# Patient Record
Sex: FEMALE | Race: WHITE | URBAN - METROPOLITAN AREA
[De-identification: names, ages, dates, MRNs, and addresses within clinical notes are randomized per-mention and may not be internally consistent; named-entity substitution may affect disease eponyms.]

---

## 2019-04-24 ENCOUNTER — OFFICE VISIT (OUTPATIENT)
Dept: PRIMARY CARE CLINIC | Age: 44
End: 2019-04-24

## 2019-04-24 VITALS
HEIGHT: 63 IN | OXYGEN SATURATION: 99 % | HEART RATE: 71 BPM | BODY MASS INDEX: 30.37 KG/M2 | SYSTOLIC BLOOD PRESSURE: 118 MMHG | DIASTOLIC BLOOD PRESSURE: 82 MMHG | WEIGHT: 171.4 LBS | RESPIRATION RATE: 16 BRPM | TEMPERATURE: 98.1 F

## 2019-04-24 DIAGNOSIS — E66.9 OBESITY (BMI 30-39.9): ICD-10-CM

## 2019-04-24 DIAGNOSIS — R53.83 FATIGUE, UNSPECIFIED TYPE: ICD-10-CM

## 2019-04-24 DIAGNOSIS — Z82.49 FAMILY HISTORY OF HEART DISEASE: ICD-10-CM

## 2019-04-24 DIAGNOSIS — R00.2 INTERMITTENT PALPITATIONS: Primary | ICD-10-CM

## 2019-04-24 NOTE — PROGRESS NOTES
HPI:  
 
Chief Complaint Patient presents with [facility] Establish Care  Palpitations  
  patient reports rapid heart rate since last thursday, would like to discuss with you more in detail. Patient is a 37 y.o. female with no significant history who presents as new patient for evaluation of palpitations and fatigue. Patient reports intermittent \"heart racing\" over 3 day period this past weekend, but asymptomatic the past 2 days. She feels fine today. Palpitations occur randomly, both with activity and at rest.  Denies associated chest pain/pressure, dyspnea, dizziness, lightheadedness, nausea, vomiting, diaphoresis, abdominal pain, back pain. Has felt more fatigued past several months. Has little energy. Denies increased stress recently. Does drink about 4 cups of coffee in the morning and a bottle of wine at night. She tries to drink 64 oz water daily. Reports her father had 3 heart attacks,  first MI at age 55. Reports her daughter has pacemaker, but does not recall the name of her condition. States that her father also had pacemaker for the same condition. Patient Active Problem List  
Diagnosis Code  Obesity (BMI 30-39. 9) E66.9 Current Outpatient Medications Medication Sig Dispense Refill  mv-min/iron/folic/calcium/vitK (WOMEN'S MULTIVITAMIN PO) Take  by mouth. No Known Allergies History reviewed. No pertinent past medical history. Past Surgical History:  
Procedure Laterality Date  HX ABDOMINOPLASTY  2017  HX BREAST AUGMENTATION  2001  HX OTHER SURGICAL  2010  
 tubal reversal   
 HX TUBAL LIGATION  1998  HX WISDOM TEETH EXTRACTION Family History Problem Relation Age of Onset  Heart Attack Father   
     age 55; x3  
 Heart Disease Father   
     pacemaker, defibrillator  Pancreatic Cancer Paternal Grandmother  Heart Disease Paternal Grandfather  Cancer Paternal Grandfather   
     throat cancer  Heart Disease Daughter pacemaker Social History Tobacco Use  Smoking status: Former Smoker Years: 25.00 Last attempt to quit: 2017 Years since quittin.3  Smokeless tobacco: Never Used Substance Use Topics  Alcohol use: Yes Comment: 4 glasses of wine per night ROS:  
Pertinent items are noted in HPI. Objective:  
 
Vitals:  
 19 0825 BP: 118/82 Pulse: 71 Resp: 16 Temp: 98.1 °F (36.7 °C) TempSrc: Oral  
SpO2: 99% Weight: 171 lb 6.4 oz (77.7 kg) Height: 5' 3\" (1.6 m) Vitals and Nurse Documentation reviewed. Physical Examination:  
General appearance - alert, well appearing, and in no distress Mental status - alert, oriented to person, place, and time, normal mood, behavior, speech, dress, motor activity, and thought processes Eyes - pupils equal and reactive, extraocular eye movements intact Ears - bilateral TM's and external ear canals normal 
Nose - normal and patent, no erythema, discharge or polyps Mouth - mucous membranes moist, pharynx normal without lesions Neck - supple, no significant adenopathy, thyroid is normal in size without nodules or tenderness Chest - clear to auscultation, no wheezes, rales or rhonchi, symmetric air entry Heart - normal rate, regular rhythm, normal S1, S2, no murmurs, rubs, clicks or gallops Neurological - alert, oriented, normal speech, no focal findings or movement disorder noted Extremities - peripheral pulses normal, no pedal edema, no clubbing or cyanosis Assessment/ Plan:  
Diagnoses and all orders for this visit: 
 
1. Intermittent palpitations -     Intermittent palpitations over 3 day period, asymptomatic for past 2 days.   
-     AMB POC EKG ROUTINE W/ 12 LEADS, INTER & REP with sinus rhythm, no acute ST-T wave changes. Reviewed EKG with Dr. Efrain Díaz. -     Advised that she cut down/eliminate caffeine and gradually reduce alcohol intake -     Considering family history, will send to cardiology for evaluation -     Discussed s/s that would warrant prompt follow-up or urgent evaluation in the ED  
-     Anurag Quezada 2. Fatigue, unspecified type 
-     CBC WITH AUTOMATED DIFF 
-     METABOLIC PANEL, COMPREHENSIVE 
-     TSH AND FREE T4 
-     VITAMIN D, 25 HYDROXY 
-     LYME AB TOTAL W/RFLX W BLOT - patient lives in [facility], has had frequent tick bites in the past, most recently 1 week ago. -     REFERRAL TO CARDIOLOGY 3. Family history of heart disease 
-     REFERRAL TO CARDIOLOGY 4. Obesity (BMI 30-39.9) -     BMI discussed with patient. Discussed lifestyle changes, daily physical activity, and advised 150 minutes of exercise weekly. Discussed healthy diet choices and limiting fried, fatty foods, fast foods, processed foods, sugar-sweetened beverages/soda, and added sugars. Increase fruits, vegetables, low-fat dairy products, lean proteins, and whole grains. Follow-up and Dispositions · Return in about 6 weeks (around 6/5/2019) for Return for CPE (Physical Exam), fasting labs. I have discussed the diagnosis with the patient and the intended plan as seen in the above orders. Advised prompt follow-up if symptoms worsen or fail to improve and symptoms that would warrant emergent evaluation in ED. The patient has received an after-visit summary and questions were answered concerning future plans. I have discussed medication side effects and warnings with the patient as well. Patient expressed understanding and is in agreement with the diagnosis and plan.

## 2019-04-24 NOTE — PATIENT INSTRUCTIONS
Palpitations: Care Instructions Your Care Instructions Heart palpitations are the uncomfortable sensation that your heart is beating fast or irregularly. You might feel pounding or fluttering in your chest. It might feel like your heart is skipping a beat. Although palpitations may be caused by a heart problem, they also occur because of stress, fatigue, or use of alcohol, caffeine, or nicotine. Many medicines, including diet pills, antihistamines, decongestants, and some herbal products, can cause heart palpitations. Nearly everyone has palpitations from time to time. Depending on your symptoms, your doctor may need to do more tests to try to find the cause of your palpitations. Follow-up care is a key part of your treatment and safety. Be sure to make and go to all appointments, and call your doctor if you are having problems. It's also a good idea to know your test results and keep a list of the medicines you take. How can you care for yourself at home? · Avoid caffeine, nicotine, and excess alcohol. · Do not take illegal drugs, such as methamphetamines and cocaine. · Do not take weight loss or diet medicines unless you talk with your doctor first. 
· Get plenty of sleep. · Do not overeat. · If you have palpitations again, take deep breaths and try to relax. This may slow a racing heart. · If you start to feel lightheaded, lie down to avoid injuries that might result if you pass out and fall down. · Keep a record of your palpitations and bring it to your next doctor's appointment. Write down: ? The date and time. ? Your pulse. (If your heart is beating fast, it may be hard to count your pulse.) ? What you were doing when the palpitations started. ? How long the palpitations lasted. ? Any other symptoms. · If an activity causes palpitations, slow down or stop. Talk to your doctor before you do that activity again. · Take your medicines exactly as prescribed.  Call your doctor if you think you are having a problem with your medicine. When should you call for help? Call 911 anytime you think you may need emergency care. For example, call if: 
  · You passed out (lost consciousness).  
  · You have symptoms of a heart attack. These may include: 
? Chest pain or pressure, or a strange feeling in the chest. 
? Sweating. ? Shortness of breath. ? Pain, pressure, or a strange feeling in the back, neck, jaw, or upper belly or in one or both shoulders or arms. ? Lightheadedness or sudden weakness. ? A fast or irregular heartbeat. After you call 911, the  may tell you to chew 1 adult-strength or 2 to 4 low-dose aspirin. Wait for an ambulance. Do not try to drive yourself.  
  · You have symptoms of a stroke. These may include: 
? Sudden numbness, tingling, weakness, or loss of movement in your face, arm, or leg, especially on only one side of your body. ? Sudden vision changes. ? Sudden trouble speaking. ? Sudden confusion or trouble understanding simple statements. ? Sudden problems with walking or balance. ? A sudden, severe headache that is different from past headaches.  
 Call your doctor now or seek immediate medical care if: 
  · You have heart palpitations and: ? Are dizzy or lightheaded, or you feel like you may faint. ? Have new or increased shortness of breath.  
 Watch closely for changes in your health, and be sure to contact your doctor if: 
  · You continue to have heart palpitations. Where can you learn more? Go to http://kevin-sara.info/. Enter R508 in the search box to learn more about \"Palpitations: Care Instructions. \" Current as of: July 22, 2018 Content Version: 11.9 © 8932-1197 [employer]. Care instructions adapted under license by [employer] (which disclaims liability or warranty for this information).  If you have questions about a medical condition or this instruction, always ask your healthcare professional. [place] [address] any warranty or liability for your use of this information.

## 2019-04-24 NOTE — PROGRESS NOTES
Chief Complaint Patient presents with [facility] Establish Care  Complete Physical  
  had coffee with milk and sugar in it  Palpitations  
  patient reports rapid heart rate since last thursday, would like to discuss with you more in detail. 1. Have you been to the ER, urgent care clinic since your last visit? Hospitalized since your last visit? No 
 
2. Have you seen or consulted any other health care providers outside of the [address] [place] [address] since your last visit? Include any pap smears or colon screening.  No

## 2019-04-25 LAB
25(OH)D3+25(OH)D2 SERPL-MCNC: 37 NG/ML (ref 30–100)
ALBUMIN SERPL-MCNC: 4.8 G/DL (ref 3.5–5.5)
ALBUMIN/GLOB SERPL: 2.2 {RATIO} (ref 1.2–2.2)
ALP SERPL-CCNC: 58 IU/L (ref 39–117)
ALT SERPL-CCNC: 29 IU/L (ref 0–32)
AST SERPL-CCNC: 21 IU/L (ref 0–40)
B BURGDOR IGG+IGM SER-ACNC: <0.91 ISR (ref 0–0.9)
BASOPHILS # BLD AUTO: 0.1 X10E3/UL (ref 0–0.2)
BASOPHILS NFR BLD AUTO: 1 %
BILIRUB SERPL-MCNC: 0.4 MG/DL (ref 0–1.2)
BUN SERPL-MCNC: 11 MG/DL (ref 6–24)
BUN/CREAT SERPL: 15 (ref 9–23)
CALCIUM SERPL-MCNC: 9.5 MG/DL (ref 8.7–10.2)
CHLORIDE SERPL-SCNC: 101 MMOL/L (ref 96–106)
CO2 SERPL-SCNC: 21 MMOL/L (ref 20–29)
CREAT SERPL-MCNC: 0.75 MG/DL (ref 0.57–1)
EOSINOPHIL # BLD AUTO: 0.1 X10E3/UL (ref 0–0.4)
EOSINOPHIL NFR BLD AUTO: 1 %
ERYTHROCYTE [DISTWIDTH] IN BLOOD BY AUTOMATED COUNT: 13.2 % (ref 12.3–15.4)
GLOBULIN SER CALC-MCNC: 2.2 G/DL (ref 1.5–4.5)
GLUCOSE SERPL-MCNC: 92 MG/DL (ref 65–99)
HCT VFR BLD AUTO: 44.9 % (ref 34–46.6)
HGB BLD-MCNC: 14.7 G/DL (ref 11.1–15.9)
IMM GRANULOCYTES # BLD AUTO: 0 X10E3/UL (ref 0–0.1)
IMM GRANULOCYTES NFR BLD AUTO: 0 %
LYMPHOCYTES # BLD AUTO: 2.1 X10E3/UL (ref 0.7–3.1)
LYMPHOCYTES NFR BLD AUTO: 32 %
MCH RBC QN AUTO: 32.9 PG (ref 26.6–33)
MCHC RBC AUTO-ENTMCNC: 32.7 G/DL (ref 31.5–35.7)
MCV RBC AUTO: 100 FL (ref 79–97)
MONOCYTES # BLD AUTO: 0.5 X10E3/UL (ref 0.1–0.9)
MONOCYTES NFR BLD AUTO: 7 %
NEUTROPHILS # BLD AUTO: 3.9 X10E3/UL (ref 1.4–7)
NEUTROPHILS NFR BLD AUTO: 59 %
PLATELET # BLD AUTO: 278 X10E3/UL (ref 150–379)
POTASSIUM SERPL-SCNC: 4.2 MMOL/L (ref 3.5–5.2)
PROT SERPL-MCNC: 7 G/DL (ref 6–8.5)
RBC # BLD AUTO: 4.47 X10E6/UL (ref 3.77–5.28)
SODIUM SERPL-SCNC: 139 MMOL/L (ref 134–144)
T4 FREE SERPL-MCNC: 1.21 NG/DL (ref 0.82–1.77)
TSH SERPL DL<=0.005 MIU/L-ACNC: 3.36 UIU/ML (ref 0.45–4.5)
WBC # BLD AUTO: 6.6 X10E3/UL (ref 3.4–10.8)

## 2019-04-25 NOTE — PROGRESS NOTES
Please notify patient: 
 
CBC, kidney, liver, thyroid, and vitamin D are normal.  Lyme disease antibodies are negative.

## 2019-05-02 ENCOUNTER — CLINICAL SUPPORT (OUTPATIENT)
Dept: CARDIOLOGY CLINIC | Age: 44
End: 2019-05-02

## 2019-05-02 ENCOUNTER — OFFICE VISIT (OUTPATIENT)
Dept: CARDIOLOGY CLINIC | Age: 44
End: 2019-05-02

## 2019-05-02 VITALS
WEIGHT: 170.8 LBS | DIASTOLIC BLOOD PRESSURE: 70 MMHG | OXYGEN SATURATION: 99 % | RESPIRATION RATE: 16 BRPM | HEART RATE: 68 BPM | BODY MASS INDEX: 30.26 KG/M2 | SYSTOLIC BLOOD PRESSURE: 110 MMHG | HEIGHT: 63 IN

## 2019-05-02 DIAGNOSIS — R00.2 HEART PALPITATIONS: ICD-10-CM

## 2019-05-02 DIAGNOSIS — Z82.49 FAMILY HISTORY OF EARLY CAD: ICD-10-CM

## 2019-05-02 DIAGNOSIS — Z87.891 HISTORY OF TOBACCO USE: ICD-10-CM

## 2019-05-02 DIAGNOSIS — R00.2 HEART PALPITATIONS: Primary | ICD-10-CM

## 2019-05-02 NOTE — PROGRESS NOTES
Chief Complaint   Patient presents with    Palpitations     Family history of CAD. Complaints of shortness of breath. Denies chest pain/swelling/dizziness.       Fatigue

## 2019-05-02 NOTE — PROGRESS NOTES
[facility] [address]: [place]  [phone]    History of Present Illness:  Ms. Grace Whelan is a 38 yo F with history of tobacco use, quit in 2017, family history of early coronary artery disease, referred Samanta Palafox NP for cardiac evaluation. She is here due to recent palpitations. They started a few weeks ago where she felt her heart racing for about six days straight on and off, lasting minutes up to an hour at a time. Easter Sunday, this was happening throughout the day, almost all day. She denies any lightheadedness, dizziness. She felt like it was harder to get a breath while it was happening. Separately, she has not had shortness of breath. She denies any exertional chest pains. She does feel like in the past she may have a day or two where her heart was going faster, but this was not consistent until recent. She last had palpitation yesterday. When these do happen, they occur without any particular exacerbating factors or triggers. She is compensated on exam with clear lungs and no lower extremity edema. Her blood pressure is 110/70 with a heart rate of 68. Her EKG I reviewed personally from 04/24/2019 was normal sinus rhythm, nonspecific ST-T wave abnormality, heart rate of 62. Soc hx. Quit tobacco.  Insurance.   Fam hx. Dad MI 54 yo, pacer, ICD. Assessment and Plan:    1. Palpitations. Will obtain a loop monitor to evaluate for possible arrhythmia. 2. Family history of early coronary artery disease. 3. Tobacco use. Quit in 2017. She  has no past medical history on file. All other systems negative except as above. PE  Vitals:    05/02/19 1001   BP: 110/70   Pulse: 68   Resp: 16   SpO2: 99%   Weight: 170 lb 12.8 oz (77.5 kg)   Height: 5' 3\" (1.6 m)    Body mass index is 30.26 kg/m².    General appearance - alert, well appearing, and in no distress  Mental status - affect appropriate to mood  Eyes - sclera anicteric, moist mucous membranes  Neck - supple, no JVD  Chest - clear to auscultation, no wheezes, rales or rhonchi  Heart - normal rate, regular rhythm, normal S1, S2, no murmurs, rubs, clicks or gallops  Abdomen - soft, nontender, nondistended, no masses or organomegaly  Neurological - no focal deficit  Extremities - peripheral pulses normal, no pedal edema      Recent Labs:  No results found for: CHOL, CHOLX, CHLST, CHOLV, 354094, HDL, LDL, LDLC, DLDLP, TGLX, TRIGL, TRIGP, CHHD, CHHDX  Lab Results   Component Value Date/Time    Creatinine 0.75 2019 09:51 AM     Lab Results   Component Value Date/Time    BUN 11 2019 09:51 AM     Lab Results   Component Value Date/Time    Potassium 4.2 2019 09:51 AM     No results found for: HBA1C, HGBE8, RYL4BHDW, HHO3EOOG  Lab Results   Component Value Date/Time    HGB 14.7 2019 09:51 AM     Lab Results   Component Value Date/Time    PLATELET 033  09:51 AM       Reviewed:  No past medical history on file. Social History     Tobacco Use   Smoking Status Former Smoker    Years: 25.00    Last attempt to quit: 2017    Years since quittin.3   Smokeless Tobacco Never Used     Social History     Substance and Sexual Activity   Alcohol Use Yes    Comment: 4 glasses of wine per night     No Known Allergies    Current Outpatient Medications   Medication Sig    mv-min/iron/folic/calcium/vitK (WOMEN'S MULTIVITAMIN PO) Take  by mouth daily. No current facility-administered medications for this visit.         Antonio Cahnce MD  [facility] heart and Vascular [place]  [address] [facility] [address]  [address]

## 2019-06-12 ENCOUNTER — TELEPHONE (OUTPATIENT)
Dept: CARDIOLOGY CLINIC | Age: 44
End: 2019-06-12

## 2019-06-12 NOTE — TELEPHONE ENCOUNTER
----- Message from Louvella Goldmann, MD sent at 6/12/2019  9:41 AM EDT -----  Please let pt know loop did show episodes of her heart going fast (SVT). Recommend starting toprol 25 mg once daily; f/u in 2-3 weeks after to assess symptoms on medication.  thx

## 2019-06-12 NOTE — LETTER
6/24/2019 9:58 AM 
 
Ms. Agnes Mark [address] [facility] Ms. Leonidas Roman, Please call the office to get your loop monitor results. We have attempted to call and send My Chart messages and have been unable to get in touch with you. Please call the office at [phone]. Sincerely, Zora Arora MD